# Patient Record
Sex: FEMALE | Race: WHITE | NOT HISPANIC OR LATINO | Employment: STUDENT | URBAN - METROPOLITAN AREA
[De-identification: names, ages, dates, MRNs, and addresses within clinical notes are randomized per-mention and may not be internally consistent; named-entity substitution may affect disease eponyms.]

---

## 2020-02-22 ENCOUNTER — HOSPITAL ENCOUNTER (EMERGENCY)
Facility: HOSPITAL | Age: 20
Discharge: HOME | End: 2020-02-22
Attending: EMERGENCY MEDICINE | Admitting: EMERGENCY MEDICINE

## 2020-02-22 VITALS
HEART RATE: 87 BPM | BODY MASS INDEX: 26.04 KG/M2 | OXYGEN SATURATION: 100 % | SYSTOLIC BLOOD PRESSURE: 138 MMHG | DIASTOLIC BLOOD PRESSURE: 74 MMHG | WEIGHT: 141.5 LBS | HEIGHT: 62 IN | RESPIRATION RATE: 14 BRPM | TEMPERATURE: 97.1 F

## 2020-02-22 DIAGNOSIS — N39.0 ACUTE UTI: ICD-10-CM

## 2020-02-22 DIAGNOSIS — B37.31 CANDIDA VAGINITIS: Primary | ICD-10-CM

## 2020-02-22 DIAGNOSIS — B00.9 HSV-1 (HERPES SIMPLEX VIRUS 1) INFECTION: ICD-10-CM

## 2020-02-22 LAB
ALBUMIN SERPL-MCNC: 3.9 G/DL (ref 3.5–5.2)
ALBUMIN/GLOB SERPL: 2.1 G/DL
ALP SERPL-CCNC: 55 U/L (ref 39–117)
ALT SERPL W P-5'-P-CCNC: 7 U/L (ref 1–33)
ANION GAP SERPL CALCULATED.3IONS-SCNC: 9.2 MMOL/L (ref 5–15)
AST SERPL-CCNC: 12 U/L (ref 1–32)
BASOPHILS # BLD AUTO: 0.01 10*3/MM3 (ref 0–0.2)
BASOPHILS NFR BLD AUTO: 0.2 % (ref 0–1.5)
BILIRUB SERPL-MCNC: <0.2 MG/DL (ref 0.2–1.2)
BUN BLD-MCNC: 9 MG/DL (ref 6–20)
BUN/CREAT SERPL: 13.4 (ref 7–25)
CALCIUM SPEC-SCNC: 8.5 MG/DL (ref 8.6–10.5)
CHLORIDE SERPL-SCNC: 105 MMOL/L (ref 98–107)
CO2 SERPL-SCNC: 23.8 MMOL/L (ref 22–29)
CREAT BLD-MCNC: 0.67 MG/DL (ref 0.57–1)
D-LACTATE SERPL-SCNC: 0.7 MMOL/L (ref 0.5–2)
DEPRECATED RDW RBC AUTO: 40.7 FL (ref 37–54)
EOSINOPHIL # BLD AUTO: 0.11 10*3/MM3 (ref 0–0.4)
EOSINOPHIL NFR BLD AUTO: 2 % (ref 0.3–6.2)
ERYTHROCYTE [DISTWIDTH] IN BLOOD BY AUTOMATED COUNT: 12.4 % (ref 12.3–15.4)
GFR SERPL CREATININE-BSD FRML MDRD: 112 ML/MIN/1.73
GLOBULIN UR ELPH-MCNC: 1.9 GM/DL
GLUCOSE BLD-MCNC: 108 MG/DL (ref 65–99)
HCG SERPL QL: NEGATIVE
HCT VFR BLD AUTO: 38.8 % (ref 34–46.6)
HGB BLD-MCNC: 13.1 G/DL (ref 12–15.9)
HOLD SPECIMEN: NORMAL
IMM GRANULOCYTES # BLD AUTO: 0.02 10*3/MM3 (ref 0–0.05)
IMM GRANULOCYTES NFR BLD AUTO: 0.4 % (ref 0–0.5)
LYMPHOCYTES # BLD AUTO: 1.19 10*3/MM3 (ref 0.7–3.1)
LYMPHOCYTES NFR BLD AUTO: 21.4 % (ref 19.6–45.3)
MCH RBC QN AUTO: 30.1 PG (ref 26.6–33)
MCHC RBC AUTO-ENTMCNC: 33.8 G/DL (ref 31.5–35.7)
MCV RBC AUTO: 89.2 FL (ref 79–97)
MONOCYTES # BLD AUTO: 0.85 10*3/MM3 (ref 0.1–0.9)
MONOCYTES NFR BLD AUTO: 15.3 % (ref 5–12)
NEUTROPHILS # BLD AUTO: 3.37 10*3/MM3 (ref 1.7–7)
NEUTROPHILS NFR BLD AUTO: 60.7 % (ref 42.7–76)
NRBC BLD AUTO-RTO: 0 /100 WBC (ref 0–0.2)
PLATELET # BLD AUTO: 172 10*3/MM3 (ref 140–450)
PMV BLD AUTO: 9.9 FL (ref 6–12)
POTASSIUM BLD-SCNC: 3.7 MMOL/L (ref 3.5–5.2)
PROT SERPL-MCNC: 5.8 G/DL (ref 6–8.5)
RBC # BLD AUTO: 4.35 10*6/MM3 (ref 3.77–5.28)
SODIUM BLD-SCNC: 138 MMOL/L (ref 136–145)
WBC NRBC COR # BLD: 5.55 10*3/MM3 (ref 3.4–10.8)
WHOLE BLOOD HOLD SPECIMEN: NORMAL
WHOLE BLOOD HOLD SPECIMEN: NORMAL

## 2020-02-22 PROCEDURE — 99284 EMERGENCY DEPT VISIT MOD MDM: CPT

## 2020-02-22 PROCEDURE — 85025 COMPLETE CBC W/AUTO DIFF WBC: CPT | Performed by: PHYSICIAN ASSISTANT

## 2020-02-22 PROCEDURE — 36415 COLL VENOUS BLD VENIPUNCTURE: CPT

## 2020-02-22 PROCEDURE — 80053 COMPREHEN METABOLIC PANEL: CPT | Performed by: PHYSICIAN ASSISTANT

## 2020-02-22 PROCEDURE — 84703 CHORIONIC GONADOTROPIN ASSAY: CPT | Performed by: PHYSICIAN ASSISTANT

## 2020-02-22 PROCEDURE — 83605 ASSAY OF LACTIC ACID: CPT | Performed by: PHYSICIAN ASSISTANT

## 2020-02-22 RX ORDER — PHENAZOPYRIDINE HYDROCHLORIDE 200 MG/1
200 TABLET, FILM COATED ORAL 3 TIMES DAILY PRN
Qty: 6 TABLET | Refills: 0 | Status: SHIPPED | OUTPATIENT
Start: 2020-02-22

## 2020-02-22 RX ORDER — FLUCONAZOLE 100 MG/1
100 TABLET ORAL ONCE
Status: COMPLETED | OUTPATIENT
Start: 2020-02-22 | End: 2020-02-22

## 2020-02-22 RX ORDER — PHENAZOPYRIDINE HYDROCHLORIDE 100 MG/1
200 TABLET, FILM COATED ORAL ONCE
Status: COMPLETED | OUTPATIENT
Start: 2020-02-22 | End: 2020-02-22

## 2020-02-22 RX ADMIN — FLUCONAZOLE 100 MG: 100 TABLET ORAL at 06:50

## 2020-02-22 RX ADMIN — PHENAZOPYRIDINE HYDROCHLORIDE 200 MG: 100 TABLET ORAL at 05:31

## 2020-02-22 NOTE — DISCHARGE INSTRUCTIONS
Complete your antibiotics  Been taking the Valtrex and take until completed  Follow-up with gynecologist of choice, call Monday to schedule an appointment  You will need to notify your partners  Return to the ER for fever, chills, vomiting, abdominal pain or any new or worsening symptoms

## 2020-02-22 NOTE — ED PROVIDER NOTES
MD ATTESTATION NOTE    The RAJESH and I have discussed this patient's history, physical exam, and treatment plan.  I have reviewed the documentation and personally had a face to face interaction with the patient. I affirm the documentation and agree with the treatment and plan.  The attached note describes my personal findings.    Pt with dysuria that began 2 days ago. She also states having some pelvic pain. The pain occurs with every urination. She has been taking Keflex.      Limited physical exam:  Pt is resting comfortably, in no distress, and without focal neurologic deficit. Abd is soft and nontender. Heart RRR without murmur.     Plan to check labs.       Documentation assistance provided by pat Watson for Dr Bond.  Information recorded by the scribe was done at my direction and has been verified and validated by me.       Izaiah Watson  02/22/20 0604       Chad Bond MD  02/22/20 1868

## 2020-02-22 NOTE — ED TRIAGE NOTES
Pt to ED from home with c/o urinary frequency and subjective fever at home but did not take temperature. Pt states she was seen at [facility] on Thursday and prescribed abx for UTI. Pt states abx ineffective for symptoms. Pt states nausea. No V/D.

## 2020-02-22 NOTE — ED PROVIDER NOTES
EMERGENCY DEPARTMENT ENCOUNTER    Room Number:  23/23  Date seen:  2/26/2020  Time seen: 5:04 AM  PCP: System, Provider Not In    HPI:  Chief complaint: Dysuria  Context:Blanca Hdez is a 19 y.o. female who presents to the ED with c/o dysuria that began 2 days ago. Pt also c/o subjective fever, urinary frequency, and mild suprapubic abd pain, but denies hematuria. She has taken Tylenol for her fever. Pt was seen at an [facility] yesterday and prescribed Cephalexin for treatment of a UTI/yeast infection.    Onset: Gradual  Duration: Began 2 days ago  Timing: Constant  Aggravating Factors: None stated  Alleviating Factors: None stated  Severity: Moderate    ALLERGIES  Patient has no known allergies.    PAST MEDICAL HISTORY  Active Ambulatory Problems     Diagnosis Date Noted   • No Active Ambulatory Problems     Resolved Ambulatory Problems     Diagnosis Date Noted   • No Resolved Ambulatory Problems     Past Medical History:   Diagnosis Date   • Anemia    • Asthma        PAST SURGICAL HISTORY  History reviewed. No pertinent surgical history.    FAMILY HISTORY  History reviewed. No pertinent family history.    SOCIAL HISTORY  Social History     Socioeconomic History   • Marital status: Single     Spouse name: Not on file   • Number of children: Not on file   • Years of education: Not on file   • Highest education level: Not on file   Tobacco Use   • Smoking status: Never Smoker   Substance and Sexual Activity   • Alcohol use: Yes   • Drug use: Not Currently       REVIEW OF SYSTEMS  Review of Systems   Constitutional: Positive for fever (subjective).   HENT: Negative for sore throat.    Eyes: Negative.    Respiratory: Negative for cough and shortness of breath.    Cardiovascular: Negative for chest pain.   Gastrointestinal: Positive for abdominal pain (mild suprapubic pain). Negative for diarrhea and vomiting.   Genitourinary: Positive for dysuria and frequency. Negative for hematuria.   Musculoskeletal: Negative for neck  pain.   Skin: Negative for rash.   Allergic/Immunologic: Negative.    Neurological: Negative for weakness, numbness and headaches.   Hematological: Negative.    Psychiatric/Behavioral: Negative.    All other systems reviewed and are negative.      PHYSICAL EXAM  ED Triage Vitals   Temp Heart Rate Resp BP SpO2   02/22/20 0424 02/22/20 0424 02/22/20 0424 02/22/20 0440 02/22/20 0424   97.1 °F (36.2 °C) 110 16 120/75 100 %      Temp src Heart Rate Source Patient Position BP Location FiO2 (%)   02/22/20 0424 02/22/20 0424 -- -- --   Tympanic Monitor        Physical Exam   Constitutional: She is oriented to person, place, and time. No distress.   HENT:   Head: Normocephalic and atraumatic.   Eyes: Pupils are equal, round, and reactive to light. EOM are normal.   Neck: Normal range of motion. Neck supple.   Cardiovascular: Normal rate, regular rhythm and normal heart sounds.   Pulmonary/Chest: Effort normal and breath sounds normal. No respiratory distress.   Abdominal: Soft. There is tenderness (questionable suprapubic). There is no rebound, no guarding and no CVA tenderness.   Musculoskeletal: Normal range of motion. She exhibits no edema.   Neurological: She is alert and oriented to person, place, and time. She has normal sensation and normal strength.   Skin: Skin is warm and dry. No rash noted.   Psychiatric: Mood and affect normal.   Nursing note and vitals reviewed.      LAB RESULTS  No results found for this or any previous visit (from the past 24 hour(s)).    I ordered the above labs and reviewed the results    MEDICATIONS GIVEN IN ER  Medications   fluconazole (DIFLUCAN) tablet 100 mg (100 mg Oral Given 2/22/20 0650)   phenazopyridine (PYRIDIUM) tablet 200 mg (200 mg Oral Given 2/22/20 0531)       PROGRESS AND CONSULTS    Progress Notes:       0452 Ordered UA for further evaluation.    0500 Ordered labs for further evaluation.    0513 Ordered pyridium and diflucan.    0600 Still awaiting urine.  Patient turned  "over to [facility].  We both have reviewed the previous [facility] done a few days ago.    Reviewed pt's history and workup with Dr. Bond.  After a bedside evaluation; Dr. Bond agrees with the plan of care.    Disposition vitals:  /74   Pulse 87   Temp 97.1 °F (36.2 °C) (Tympanic)   Resp 14   Ht 157.5 cm (62\")   Wt 64.2 kg (141 lb 8 oz)   LMP 02/19/2020   SpO2 100%   BMI 25.88 kg/m²       DIAGNOSIS  Final diagnoses:   Candida vaginitis   HSV-1 (herpes simplex virus 1) infection   Acute UTI       FOLLOW UP   PATIENT [place] [address]  [phone]        Ralph Santacruz MD  [address] [address]  [phone]            RX     Medication List      New Prescriptions    phenazopyridine 200 MG tablet  Commonly known as:  PYRIDIUM  Take 1 tablet by mouth 3 (Three) Times a Day As Needed for Bladder Spasms.        Documentation assistance provided by pat Baez III, PA for Valente Baez PA-C.  Information recorded by the pat was done at my direction and has been verified and validated by me.     Carmen Saul  02/22/20 0520       Ricky Baez III, PA  02/26/20 0605    "

## 2020-02-22 NOTE — ED NOTES
"Pt c/o dysuria x2-3 days.  was dx with a UTI and started on cephalexin on Thursday. States \"they didn't give me anything for pain.\" States has some nausea \"with the pain,\" but denies nausea currently or vomiting. Denies ABD pain or flank pain. States also has \"vaginal pain\" and is being check for \"a vaginal infection but it hasn't come back yet.\" Denies known exposure to STDs. States has unprotected sex with a known partner \"and we are both clean.\" NAD, respirations even and unlabored.     Lynn Blackmon, RN  02/22/20 7447    "

## 2020-02-24 ENCOUNTER — TELEPHONE (OUTPATIENT)
Dept: OBSTETRICS AND GYNECOLOGY | Facility: CLINIC | Age: 20
End: 2020-02-24

## 2020-02-24 NOTE — TELEPHONE ENCOUNTER
Patient is scheduled for 03/10 at 2:20 she is aware of location and aware that there may be a wait.

## 2020-02-24 NOTE — TELEPHONE ENCOUNTER
Patient is calling because she was seen in the ER and told to schedule a appointment with you. She is scheduled for the first available on 04/13 but she wants to know if she can be worked in sooner because the ER told her she needed to follow up with someone sooner.

## 2020-02-24 NOTE — TELEPHONE ENCOUNTER
You can work her in at [facility] March 10 in the afternoon after 2 but let her know that if I get call for delivery she could have to wait.  MAU
